# Patient Record
Sex: FEMALE | Race: WHITE | NOT HISPANIC OR LATINO
[De-identification: names, ages, dates, MRNs, and addresses within clinical notes are randomized per-mention and may not be internally consistent; named-entity substitution may affect disease eponyms.]

---
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
## 2021-10-22 ENCOUNTER — APPOINTMENT (OUTPATIENT)
Dept: NEUROLOGY | Facility: CLINIC | Age: 21
End: 2021-10-22
Payer: MEDICAID

## 2021-10-22 ENCOUNTER — NON-APPOINTMENT (OUTPATIENT)
Age: 21
End: 2021-10-22

## 2021-10-22 VITALS
BODY MASS INDEX: 21.85 KG/M2 | SYSTOLIC BLOOD PRESSURE: 126 MMHG | HEIGHT: 64 IN | TEMPERATURE: 98 F | OXYGEN SATURATION: 98 % | HEART RATE: 73 BPM | DIASTOLIC BLOOD PRESSURE: 77 MMHG | WEIGHT: 128 LBS

## 2021-10-22 DIAGNOSIS — H93.19 TINNITUS, UNSPECIFIED EAR: ICD-10-CM

## 2021-10-22 DIAGNOSIS — Q85.00 NEUROFIBROMATOSIS, UNSPECIFIED: ICD-10-CM

## 2021-10-22 PROCEDURE — 99212 OFFICE O/P EST SF 10 MIN: CPT

## 2021-10-22 NOTE — HISTORY OF PRESENT ILLNESS
[FreeTextEntry1] : BOOM PACHECO is a 21 year old woman with history of neurofibromatosis type 1 is here to establish her care with adult neurology. She used to follow with Dr Rodriguez and her last visit was August 2020. Per note, she has been asymptomatic for any complaints, except sporadic tinnitus, but does have extensive café au lait spots all across her trunk, back and proximal extremities/axilla/inguinal area. She has also had two small neurofibromas biopsied from her left flank and left hip areas. \par She had a follow up MRI of the head on  August 2020 which shows diffuse expansion of the right sphenoid, temporal calvarium and posterior right zygomatic arch with invagination on the right temporal lobe consistent with bony dysplasia. There are multiple parenchymal signal foci consistent with neurofibromatosis and small contrast enhancing lesion in left superficial left temporal love suggestive of 6 mm meningioma. \par \par Today she reports having more headaches. She reports headaches in the past but they are more frequent. Describes the headaches as pressure like sensation in the frontal region with no associated symptoms. Denies blurred vision, nausea, or vision changes. Reports intermittent tinnitus in the right ear and occasional neck stiffness. \par \par  None

## 2021-10-22 NOTE — PHYSICAL EXAM
[FreeTextEntry1] : Mental status: Awake, alert and oriented x3.  Recent and remote memory intact.  Naming, repetition and comprehension intact.  Attention/concentration intact.  No dysarthria, no aphasia.  Fund of knowledge appropriate.  \par Cranial nerves: Pupils equally round and reactive to light, visual fields full, no nystagmus, extraocular muscles intact, V1 through V3 intact bilaterally and symmetric, face symmetric, hearing intact to finger rub, palate elevation symmetric, tongue was midline. Mild cervical paraspinal tenderness \par Motor:  MRC grading 5/5 b/l UE/LE.   strength 5/5.  Normal tone and bulk.  No abnormal movements.  \par Sensation: Intact to light touch, proprioception, and pinprick. \par Coordination: No dysmetria on finger-to-nose and heel-to-shin.  No dysdiadokinesia.\par Reflexes: 3+ in bilateral UE/LE, downgoing toes bilaterally. (+) Cain.\par Gait: Narrow and steady. No ataxia.  Romberg negative\par \par

## 2021-10-22 NOTE — ASSESSMENT
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 Reports 1-2 episodes of pressure like headaches in the frontal region. Sinus pressure headaches versus tension headaches. Fundoscopic exam shows no disc edema. No transient vision loss. Has hyperreflexia and neck stiffness on exam. \par \par - MRI brain with IAC w/wo contrast\par - MRI cervical spine\par - Continue to take Motrin PRN\par - Follow up with ENT and ophthalmology \par - RTC in 6 months \par

## 2021-10-23 ENCOUNTER — TRANSCRIPTION ENCOUNTER (OUTPATIENT)
Age: 21
End: 2021-10-23

## 2021-11-24 ENCOUNTER — APPOINTMENT (OUTPATIENT)
Dept: MRI IMAGING | Facility: HOSPITAL | Age: 21
End: 2021-11-24

## 2022-04-26 ENCOUNTER — APPOINTMENT (OUTPATIENT)
Dept: NEUROLOGY | Facility: CLINIC | Age: 22
End: 2022-04-26